# Patient Record
Sex: MALE | Race: WHITE | NOT HISPANIC OR LATINO | Employment: FULL TIME | URBAN - NONMETROPOLITAN AREA
[De-identification: names, ages, dates, MRNs, and addresses within clinical notes are randomized per-mention and may not be internally consistent; named-entity substitution may affect disease eponyms.]

---

## 2017-01-01 ENCOUNTER — HOSPITAL ENCOUNTER (OUTPATIENT)
Dept: PHYSICAL THERAPY | Facility: HOSPITAL | Age: 37
Setting detail: THERAPIES SERIES
Discharge: HOME | End: 2017-01-01
Attending: FAMILY MEDICINE | Admitting: FAMILY MEDICINE

## 2017-01-27 ENCOUNTER — OFFICE VISIT (OUTPATIENT)
Dept: ORTHOPEDIC SURGERY | Facility: CLINIC | Age: 37
End: 2017-01-27

## 2017-01-27 VITALS — WEIGHT: 196 LBS | BODY MASS INDEX: 29.03 KG/M2 | HEIGHT: 69 IN

## 2017-01-27 DIAGNOSIS — M51.26 LUMBAR DISC HERNIATION: Primary | ICD-10-CM

## 2017-01-27 DIAGNOSIS — M25.551 RIGHT HIP PAIN: ICD-10-CM

## 2017-01-27 PROCEDURE — 99203 OFFICE O/P NEW LOW 30 MIN: CPT | Performed by: NURSE PRACTITIONER

## 2017-01-27 NOTE — PROGRESS NOTES
Jose Ferris is a 36 y.o. male   Primary provider:  Ancelmo Curran MD       Chief Complaint   Patient presents with   • Right Hip - Pain, Establish Care       HISTORY OF PRESENT ILLNESS: patient states that pain increases when laying down.     HPI Comments: Patient started after helping a neighbor move.     Pain   This is a new problem. Episode onset: 10/2016. The problem occurs constantly. The problem has been unchanged. Associated symptoms include abdominal pain, fatigue, joint swelling, numbness and weakness. Associated symptoms comments: Sharp, dull ache, burning, grinding, popping. . The symptoms are aggravated by standing and walking (laying on back or side). He has tried nothing for the symptoms.        CONCURRENT MEDICAL HISTORY:    Past Medical History   Diagnosis Date   • Backache    • Carbuncle of buttock      paulina rectal   • Contact dermatitis    • Exanthematous disorder    • Obsessive compulsive disorder    • Perirectal abscess      s/p i and d      • Tobacco dependence syndrome    • Upper respiratory infection        No Known Allergies      Current Outpatient Prescriptions:   •  methadone (DOLOPHINE) 10 MG/5ML solution, Take  by mouth. 5.5 ml once a day, Disp: , Rfl:   •  sulfamethoxazole-trimethoprim (BACTRIM DS,SEPTRA DS) 800-160 MG per tablet, Take 1 tablet by mouth Daily., Disp: , Rfl:   •  amitriptyline (ELAVIL) 25 MG tablet, 1-2 qhs for back spasm till seen again, Disp: 30 tablet, Rfl: 1  •  fluocinonide (LIDEX) 0.05 % ointment, , Disp: , Rfl:     Past Surgical History   Procedure Laterality Date   • Incision and drainage abscess  12/30/2014     Incision and drainage of perirectal abscess.   • Tonsillectomy and adenoidectomy         Family History   Problem Relation Age of Onset   • Cancer Other    • Breast cancer Other    • Hypertension Mother    • Hypertension Father    • Stroke Father        Social History     Social History   • Marital status:      Spouse name: N/A   • Number of  "children: N/A   • Years of education: N/A     Occupational History   • Not on file.     Social History Main Topics   • Smoking status: Current Every Day Smoker     Packs/day: 1.50   • Smokeless tobacco: Not on file   • Alcohol use No   • Drug use: Not on file      Comment: SUBSTANCE ABUSE PAST   • Sexual activity: Not on file     Other Topics Concern   • Not on file     Social History Narrative        Review of Systems   Constitutional: Positive for fatigue.   Gastrointestinal: Positive for abdominal pain.   Musculoskeletal: Positive for back pain and joint swelling.   Neurological: Positive for weakness and numbness.   Psychiatric/Behavioral: Positive for sleep disturbance.       PHYSICAL EXAMINATION:       Visit Vitals   • Ht 69\" (175.3 cm)   • Wt 196 lb (88.9 kg)   • BMI 28.94 kg/m2       Physical Exam   Constitutional: He is oriented to person, place, and time. Vital signs are normal. He appears well-developed and well-nourished. He is cooperative.   HENT:   Head: Normocephalic and atraumatic.   Neck: Trachea normal and phonation normal.   Cardiovascular: Regular rhythm, S1 normal, S2 normal, normal heart sounds and normal pulses.    Pulmonary/Chest: Effort normal and breath sounds normal. No respiratory distress.   Abdominal: Soft. Normal appearance and bowel sounds are normal. He exhibits no distension.   Neurological: He is alert and oriented to person, place, and time. Gait abnormal. GCS eye subscore is 4. GCS verbal subscore is 5. GCS motor subscore is 6.   Skin: Skin is warm, dry and intact.   Psychiatric: He has a normal mood and affect. His speech is normal and behavior is normal. Judgment and thought content normal. Cognition and memory are normal.   Vitals reviewed.      GAIT:     []  Normal  [x]  Antalgic    Assistive device: [x]  None  []  Walker     []  Crutches  []  Cane      []  Wheelchair  []  Stretcher    Back Exam     Tenderness   The patient is experiencing tenderness in the sacroiliac and " lumbar.    Range of Motion   Extension: abnormal   Flexion: abnormal   Lateral Bend Right: abnormal   Lateral Bend Left: abnormal   Rotation Right: abnormal   Rotation Left: abnormal     Muscle Strength   Right Quadriceps:  5/5   Left Quadriceps:  5/5   Right Hamstrings:  4/5   Left Hamstrings:  4/5     Tests   Straight leg raise right: positive  Straight leg raise left: positive    Reflexes   Patellar: 2/4  Achilles: 2/4    Other   Toe Walk: abnormal  Heel Walk: abnormal  Sensation: normal  Gait: abnormal   Erythema: no back redness  Scars: absent              MRI lumbar spine without contrast1/25/2017  [facility]  Result Impression         1. AT L4-5, RIGHT PARACENTRAL DISC HERNIATION IMPINGES UPON THE RIGHT L5 NERVE ROOT IN THE LATERAL RECESS.      2. NO OTHER SIGNIFICANT FINDINGS.   Result Narrative   MR LUMBAR SPINE WITHOUT CONTRAST    Comparisons:Plain films 12/21/16    Indications:Low back pain, lumbar strain    Technique: Axial and sagittal MR images of the lumbar spine without contrast.    Discussion: The patient was unable to lie in the standard position in the magnet, due to severe pain, and cisterns are midline on his side. Also, the patient required being brought out of the magnet after each sequence due to pain. Images are motion   degraded.    L1-2:Normal    L2-3:Normal    L3-4:Minimal disc bulging without central or foraminal encroachment    L4-5:Moderate sized focal right paracentral disc protrusion/herniation impinges upon the right L5 nerve root in the lateral recess and could be a source of a right L5 radiculopathy. There is minor caudal extrusion of disc material. No foraminal stenosis.    L5-S1:Normal    Bony signal is normal. The visualized distal spinal cord and paraspinal soft tissues are normal         X-ray lumbar spine AP and [id number]/21/2016  [facility]  Result Impression     Unremarkable imaging of the lumbar spine.   Result Narrative   Exam: AP and lateral pelvis      Clinical Indication: Pain    Technique: AP, Lateral     Comparison: None    Findings: No acute fractures or bony lesions appreciated. There is slight loss of lumbar lordosis. No spondylolysis or spondylolisthesis is seen within the lumbar spine. No considerable joint space narrowing within the lumbar spine. The sacroiliac joints   are unremarkable. There is a very gentle apex right lumbar curvature seen on the coronal plane.         No results found.        ASSESSMENT:    Diagnoses and all orders for this visit:    Lumbar disc herniation  Comments:  L 4/5 right   Orders:  -     Cancel: Case Request  -     Case Request    Right hip pain  -     Cancel: Case Request  -     Case Request          PLAN  Dr. Akins reviewed the MRI and evaluated the patient with me today recommending laminectomy discectomy at L4 5 on the right.  Patients fail consist of conservative management with physical therapy, consistent doses of steroids anti-inflammatories and pain medications over the past several weeks.  He's been unable to work his regular duty job for several weeks now due to the pain that is exacerbated with standing and becomes unbearable after about an hour and a half of work.  He'll need to remain off work until after he has healed from the surgery.  Risks benefits and treatment options discussed in detail with the patient by Dr. Akins and he verbalized understanding of discharge instructions and plan of care.  No Follow-up on file.    MARY ALICE Serrano

## 2017-01-30 ENCOUNTER — PREP FOR SURGERY (OUTPATIENT)
Dept: ORTHOPEDIC SURGERY | Facility: CLINIC | Age: 37
End: 2017-01-30

## 2017-01-30 DIAGNOSIS — M51.26 DISPLACEMENT OF LUMBAR INTERVERTEBRAL DISC WITHOUT MYELOPATHY: Primary | ICD-10-CM

## 2017-02-21 ENCOUNTER — APPOINTMENT (OUTPATIENT)
Dept: PREADMISSION TESTING | Facility: HOSPITAL | Age: 37
End: 2017-02-21

## 2017-02-21 VITALS
SYSTOLIC BLOOD PRESSURE: 138 MMHG | BODY MASS INDEX: 29.18 KG/M2 | OXYGEN SATURATION: 97 % | DIASTOLIC BLOOD PRESSURE: 80 MMHG | RESPIRATION RATE: 18 BRPM | HEIGHT: 69 IN | WEIGHT: 197 LBS | HEART RATE: 88 BPM

## 2017-02-21 DIAGNOSIS — M51.26 DISPLACEMENT OF LUMBAR INTERVERTEBRAL DISC WITHOUT MYELOPATHY: ICD-10-CM

## 2017-02-21 LAB
BASOPHILS # BLD AUTO: 0.03 10*3/MM3 (ref 0–0.2)
BASOPHILS NFR BLD AUTO: 0.3 % (ref 0–2)
DEPRECATED RDW RBC AUTO: 40.6 FL (ref 35.1–43.9)
EOSINOPHIL # BLD AUTO: 0.42 10*3/MM3 (ref 0–0.7)
EOSINOPHIL NFR BLD AUTO: 4 % (ref 0–7)
ERYTHROCYTE [DISTWIDTH] IN BLOOD BY AUTOMATED COUNT: 12.7 % (ref 11.5–14.5)
HCT VFR BLD AUTO: 43.4 % (ref 39–49)
HGB BLD-MCNC: 15.6 G/DL (ref 13.7–17.3)
IMM GRANULOCYTES # BLD: 0.03 10*3/MM3 (ref 0–0.02)
IMM GRANULOCYTES NFR BLD: 0.3 % (ref 0–0.5)
LYMPHOCYTES # BLD AUTO: 4.29 10*3/MM3 (ref 0.6–4.2)
LYMPHOCYTES NFR BLD AUTO: 40.7 % (ref 10–50)
MCH RBC QN AUTO: 31.4 PG (ref 26.5–34)
MCHC RBC AUTO-ENTMCNC: 35.9 G/DL (ref 31.5–36.3)
MCV RBC AUTO: 87.3 FL (ref 80–98)
MONOCYTES # BLD AUTO: 0.77 10*3/MM3 (ref 0–0.9)
MONOCYTES NFR BLD AUTO: 7.3 % (ref 0–12)
NEUTROPHILS # BLD AUTO: 5.01 10*3/MM3 (ref 2–8.6)
NEUTROPHILS NFR BLD AUTO: 47.4 % (ref 37–80)
NRBC BLD MANUAL-RTO: 0 /100 WBC (ref 0–0)
PLATELET # BLD AUTO: 253 10*3/MM3 (ref 150–450)
PMV BLD AUTO: 10.2 FL (ref 8–12)
RBC # BLD AUTO: 4.97 10*6/MM3 (ref 4.37–5.74)
WBC NRBC COR # BLD: 10.55 10*3/MM3 (ref 3.2–9.8)

## 2017-02-21 PROCEDURE — 36415 COLL VENOUS BLD VENIPUNCTURE: CPT

## 2017-02-21 PROCEDURE — 85025 COMPLETE CBC W/AUTO DIFF WBC: CPT | Performed by: ORTHOPAEDIC SURGERY

## 2017-02-21 RX ORDER — SODIUM CHLORIDE, SODIUM GLUCONATE, SODIUM ACETATE, POTASSIUM CHLORIDE, AND MAGNESIUM CHLORIDE 526; 502; 368; 37; 30 MG/100ML; MG/100ML; MG/100ML; MG/100ML; MG/100ML
1000 INJECTION, SOLUTION INTRAVENOUS CONTINUOUS PRN
Status: CANCELLED | OUTPATIENT
Start: 2017-02-28

## 2017-02-21 NOTE — DISCHARGE INSTRUCTIONS
[facility]    Preparing the Skin Before Surgery  Preparing or “prepping” skin before surgery can reduce the risk of infection at the surgical site. To make the process easier, this facility has chosen disposable cloths moistened with a rinse-free, 2% Chlorhexidine Gluconate (CHG) antiseptic solution. The steps below outline the prepping process and should be carefully followed.      Prep the skin at the following time(s):                       Prep the circled area(s) only:        *Skin should be prepped at home on   the night prior to surgery.         *If you wish to bathe/shower, do so   at least one hour before you prep   your skin with the cloths.  *Do not shave hair in surgical area for at   least 2 days prior to applying prep  _______________________________  _______________________________       Directions for Prepping Skin:  ? When applying CHG, your skin should be completely dry and cool.  ? Open package and remove cloths. Avoid touching cloths to any surface other than specified area to reduce the risk of contamination.  ? Prep the area(s) circled above by wiping the area in a back-and-forth motion.    Avoid contact with eyes, ears, mouth, and mucous membranes. When applied to sensitive skin, CHG may cause skin irritation such as a temporary itching sensation  and/or redness.         If itching or redness persists, rinse affected areas and discontinue use.  ? Use all cloths in the package(s).   ? Allow area to air dry for one minute. DO NOT RINSE. It is normal for the skin to have a temporary “tacky” feel for several minutes after the antiseptic solution is applied.   ? Discard cloths in trash can.  ? Place the Prep Check™ sticker(s) from the package on the bottom of this sheet as indicated.  ? Once the skin prep has been completed, do not bathe/shower, apply make-up, powder, or lotions to skin.

## 2017-02-27 ENCOUNTER — ANESTHESIA EVENT (OUTPATIENT)
Dept: PERIOP | Facility: HOSPITAL | Age: 37
End: 2017-02-27

## 2017-02-28 ENCOUNTER — HOSPITAL ENCOUNTER (OUTPATIENT)
Facility: HOSPITAL | Age: 37
Setting detail: HOSPITAL OUTPATIENT SURGERY
Discharge: HOME | End: 2017-02-28
Attending: ORTHOPAEDIC SURGERY | Admitting: ORTHOPAEDIC SURGERY

## 2017-02-28 ENCOUNTER — APPOINTMENT (OUTPATIENT)
Dept: GENERAL RADIOLOGY | Facility: HOSPITAL | Age: 37
End: 2017-02-28

## 2017-02-28 ENCOUNTER — ANESTHESIA (OUTPATIENT)
Dept: PERIOP | Facility: HOSPITAL | Age: 37
End: 2017-02-28

## 2017-02-28 VITALS
SYSTOLIC BLOOD PRESSURE: 128 MMHG | HEART RATE: 104 BPM | OXYGEN SATURATION: 96 % | DIASTOLIC BLOOD PRESSURE: 83 MMHG | RESPIRATION RATE: 20 BRPM | WEIGHT: 194.45 LBS | BODY MASS INDEX: 28.8 KG/M2 | HEIGHT: 69 IN | TEMPERATURE: 99.2 F

## 2017-02-28 DIAGNOSIS — M51.26 DISPLACEMENT OF LUMBAR INTERVERTEBRAL DISC WITHOUT MYELOPATHY: ICD-10-CM

## 2017-02-28 DIAGNOSIS — M51.26 DISPLACEMENT OF LUMBAR INTERVERTEBRAL DISC WITHOUT MYELOPATHY: Primary | ICD-10-CM

## 2017-02-28 LAB
AMPHET+METHAMPHET UR QL: NEGATIVE
ANION GAP SERPL CALCULATED.3IONS-SCNC: 10 MMOL/L (ref 5–15)
APTT PPP: 28.6 SECONDS (ref 20–40.3)
BARBITURATES UR QL SCN: NEGATIVE
BENZODIAZ UR QL SCN: NEGATIVE
BUN BLD-MCNC: 12 MG/DL (ref 7–21)
BUN/CREAT SERPL: 15.4 (ref 7–25)
CALCIUM SPEC-SCNC: 9.5 MG/DL (ref 8.4–10.2)
CANNABINOIDS SERPL QL: NEGATIVE
CHLORIDE SERPL-SCNC: 100 MMOL/L (ref 95–110)
CO2 SERPL-SCNC: 28 MMOL/L (ref 22–31)
COCAINE UR QL: NEGATIVE
CREAT BLD-MCNC: 0.78 MG/DL (ref 0.7–1.3)
DEPRECATED RDW RBC AUTO: 39.1 FL (ref 35.1–43.9)
ERYTHROCYTE [DISTWIDTH] IN BLOOD BY AUTOMATED COUNT: 12.5 % (ref 11.5–14.5)
GFR SERPL CREATININE-BSD FRML MDRD: 112 ML/MIN/1.73 (ref 70–162)
GLUCOSE BLD-MCNC: 107 MG/DL (ref 60–100)
HCT VFR BLD AUTO: 42 % (ref 39–49)
HGB BLD-MCNC: 15.4 G/DL (ref 13.7–17.3)
INR PPP: 1.01 (ref 0.8–1.2)
MCH RBC QN AUTO: 31.4 PG (ref 26.5–34)
MCHC RBC AUTO-ENTMCNC: 36.7 G/DL (ref 31.5–36.3)
MCV RBC AUTO: 85.7 FL (ref 80–98)
METHADONE UR QL SCN: POSITIVE
OPIATES UR QL: NEGATIVE
OXYCODONE UR QL SCN: NEGATIVE
PLATELET # BLD AUTO: 253 10*3/MM3 (ref 150–450)
PMV BLD AUTO: 9.8 FL (ref 8–12)
POTASSIUM BLD-SCNC: 3.9 MMOL/L (ref 3.5–5.1)
PROTHROMBIN TIME: 13.3 SECONDS (ref 11.1–15.3)
RBC # BLD AUTO: 4.9 10*6/MM3 (ref 4.37–5.74)
SODIUM BLD-SCNC: 138 MMOL/L (ref 137–145)
WBC NRBC COR # BLD: 10.77 10*3/MM3 (ref 3.2–9.8)

## 2017-02-28 PROCEDURE — 80307 DRUG TEST PRSMV CHEM ANLYZR: CPT | Performed by: ANESTHESIOLOGY

## 2017-02-28 PROCEDURE — 25010000002 METHOCARBAMOL 1000 MG/10ML SOLUTION 10 ML VIAL: Performed by: ORTHOPAEDIC SURGERY

## 2017-02-28 PROCEDURE — 76000 FLUOROSCOPY <1 HR PHYS/QHP: CPT

## 2017-02-28 PROCEDURE — 25010000002 LIDOCAINE PER 10 MG: Performed by: NURSE ANESTHETIST, CERTIFIED REGISTERED

## 2017-02-28 PROCEDURE — 25010000002 FENTANYL CITRATE (PF) 100 MCG/2ML SOLUTION: Performed by: NURSE ANESTHETIST, CERTIFIED REGISTERED

## 2017-02-28 PROCEDURE — 25010000002 VANCOMYCIN PER 500 MG: Performed by: NURSE ANESTHETIST, CERTIFIED REGISTERED

## 2017-02-28 PROCEDURE — 85610 PROTHROMBIN TIME: CPT | Performed by: ORTHOPAEDIC SURGERY

## 2017-02-28 PROCEDURE — 25010000002 HYDROMORPHONE PER 4 MG: Performed by: NURSE ANESTHETIST, CERTIFIED REGISTERED

## 2017-02-28 PROCEDURE — 63030 LAMOT DCMPRN NRV RT 1 LMBR: CPT | Performed by: ORTHOPAEDIC SURGERY

## 2017-02-28 PROCEDURE — 80048 BASIC METABOLIC PNL TOTAL CA: CPT | Performed by: ORTHOPAEDIC SURGERY

## 2017-02-28 PROCEDURE — 25010000002 PROPOFOL 10 MG/ML EMULSION: Performed by: NURSE ANESTHETIST, CERTIFIED REGISTERED

## 2017-02-28 PROCEDURE — 25010000002 ONDANSETRON PER 1 MG: Performed by: NURSE ANESTHETIST, CERTIFIED REGISTERED

## 2017-02-28 PROCEDURE — 25010000002 HYDROMORPHONE PER 4 MG: Performed by: ANESTHESIOLOGY

## 2017-02-28 PROCEDURE — 85027 COMPLETE CBC AUTOMATED: CPT | Performed by: ORTHOPAEDIC SURGERY

## 2017-02-28 PROCEDURE — 85730 THROMBOPLASTIN TIME PARTIAL: CPT | Performed by: ORTHOPAEDIC SURGERY

## 2017-02-28 PROCEDURE — 25010000002 MEPERIDINE 25 MG/0.5ML SOLUTION: Performed by: NURSE ANESTHETIST, CERTIFIED REGISTERED

## 2017-02-28 RX ORDER — FENTANYL CITRATE 50 UG/ML
50 INJECTION, SOLUTION INTRAMUSCULAR; INTRAVENOUS
Status: DISCONTINUED | OUTPATIENT
Start: 2017-02-28 | End: 2017-02-28 | Stop reason: HOSPADM

## 2017-02-28 RX ORDER — PROMETHAZINE HYDROCHLORIDE 12.5 MG/1
12.5 TABLET ORAL ONCE AS NEEDED
Status: DISCONTINUED | OUTPATIENT
Start: 2017-02-28 | End: 2017-02-28 | Stop reason: HOSPADM

## 2017-02-28 RX ORDER — PROMETHAZINE HYDROCHLORIDE 25 MG/1
25 TABLET ORAL ONCE AS NEEDED
Status: DISCONTINUED | OUTPATIENT
Start: 2017-02-28 | End: 2017-02-28 | Stop reason: HOSPADM

## 2017-02-28 RX ORDER — MAGNESIUM HYDROXIDE 1200 MG/15ML
LIQUID ORAL AS NEEDED
Status: DISCONTINUED | OUTPATIENT
Start: 2017-02-28 | End: 2017-02-28 | Stop reason: HOSPADM

## 2017-02-28 RX ORDER — LABETALOL HYDROCHLORIDE 5 MG/ML
5 INJECTION, SOLUTION INTRAVENOUS
Status: DISCONTINUED | OUTPATIENT
Start: 2017-02-28 | End: 2017-02-28 | Stop reason: HOSPADM

## 2017-02-28 RX ORDER — DIPHENHYDRAMINE HYDROCHLORIDE 50 MG/ML
6.25 INJECTION INTRAMUSCULAR; INTRAVENOUS
Status: DISCONTINUED | OUTPATIENT
Start: 2017-02-28 | End: 2017-02-28 | Stop reason: HOSPADM

## 2017-02-28 RX ORDER — FLUMAZENIL 0.1 MG/ML
0.2 INJECTION INTRAVENOUS AS NEEDED
Status: DISCONTINUED | OUTPATIENT
Start: 2017-02-28 | End: 2017-02-28 | Stop reason: HOSPADM

## 2017-02-28 RX ORDER — DIAZEPAM 5 MG/1
10 TABLET ORAL ONCE AS NEEDED
Status: DISCONTINUED | OUTPATIENT
Start: 2017-02-28 | End: 2017-02-28 | Stop reason: HOSPADM

## 2017-02-28 RX ORDER — HYDRALAZINE HYDROCHLORIDE 20 MG/ML
5 INJECTION INTRAMUSCULAR; INTRAVENOUS
Status: DISCONTINUED | OUTPATIENT
Start: 2017-02-28 | End: 2017-02-28 | Stop reason: HOSPADM

## 2017-02-28 RX ORDER — LIDOCAINE HYDROCHLORIDE ANHYDROUS AND DEXTROSE MONOHYDRATE 5; 400 G/100ML; MG/100ML
1-4 INJECTION, SOLUTION INTRAVENOUS ONCE
Status: COMPLETED | OUTPATIENT
Start: 2017-02-28 | End: 2017-02-28

## 2017-02-28 RX ORDER — PROMETHAZINE HYDROCHLORIDE 25 MG/ML
6.25 INJECTION, SOLUTION INTRAMUSCULAR; INTRAVENOUS ONCE AS NEEDED
Status: DISCONTINUED | OUTPATIENT
Start: 2017-02-28 | End: 2017-02-28 | Stop reason: HOSPADM

## 2017-02-28 RX ORDER — PROPOFOL 10 MG/ML
VIAL (ML) INTRAVENOUS AS NEEDED
Status: DISCONTINUED | OUTPATIENT
Start: 2017-02-28 | End: 2017-02-28 | Stop reason: SURG

## 2017-02-28 RX ORDER — BACITRACIN 50000 [USP'U]/1
INJECTION, POWDER, LYOPHILIZED, FOR SOLUTION INTRAMUSCULAR AS NEEDED
Status: DISCONTINUED | OUTPATIENT
Start: 2017-02-28 | End: 2017-02-28 | Stop reason: HOSPADM

## 2017-02-28 RX ORDER — FENTANYL CITRATE 50 UG/ML
INJECTION, SOLUTION INTRAMUSCULAR; INTRAVENOUS AS NEEDED
Status: DISCONTINUED | OUTPATIENT
Start: 2017-02-28 | End: 2017-02-28 | Stop reason: SURG

## 2017-02-28 RX ORDER — OXYCODONE AND ACETAMINOPHEN 7.5; 325 MG/1; MG/1
1 TABLET ORAL EVERY 4 HOURS PRN
Qty: 60 TABLET | Refills: 0 | Status: SHIPPED | OUTPATIENT
Start: 2017-02-28

## 2017-02-28 RX ORDER — ONDANSETRON 2 MG/ML
INJECTION INTRAMUSCULAR; INTRAVENOUS AS NEEDED
Status: DISCONTINUED | OUTPATIENT
Start: 2017-02-28 | End: 2017-02-28 | Stop reason: SURG

## 2017-02-28 RX ORDER — SODIUM CHLORIDE, SODIUM LACTATE, POTASSIUM CHLORIDE, CALCIUM CHLORIDE 600; 310; 30; 20 MG/100ML; MG/100ML; MG/100ML; MG/100ML
9 INJECTION, SOLUTION INTRAVENOUS CONTINUOUS
Status: DISCONTINUED | OUTPATIENT
Start: 2017-02-28 | End: 2017-02-28 | Stop reason: HOSPADM

## 2017-02-28 RX ORDER — SODIUM CHLORIDE, SODIUM GLUCONATE, SODIUM ACETATE, POTASSIUM CHLORIDE, AND MAGNESIUM CHLORIDE 526; 502; 368; 37; 30 MG/100ML; MG/100ML; MG/100ML; MG/100ML; MG/100ML
1000 INJECTION, SOLUTION INTRAVENOUS CONTINUOUS PRN
Status: DISCONTINUED | OUTPATIENT
Start: 2017-02-28 | End: 2017-02-28 | Stop reason: HOSPADM

## 2017-02-28 RX ORDER — OXYCODONE AND ACETAMINOPHEN 7.5; 325 MG/1; MG/1
1 TABLET ORAL EVERY 6 HOURS PRN
Status: DISCONTINUED | OUTPATIENT
Start: 2017-02-28 | End: 2017-02-28 | Stop reason: HOSPADM

## 2017-02-28 RX ORDER — LIDOCAINE HYDROCHLORIDE ANHYDROUS AND DEXTROSE MONOHYDRATE 5; 400 G/100ML; MG/100ML
1-4 INJECTION, SOLUTION INTRAVENOUS CONTINUOUS
Status: CANCELLED | OUTPATIENT
Start: 2017-02-28

## 2017-02-28 RX ORDER — ROCURONIUM BROMIDE 10 MG/ML
INJECTION, SOLUTION INTRAVENOUS AS NEEDED
Status: DISCONTINUED | OUTPATIENT
Start: 2017-02-28 | End: 2017-02-28 | Stop reason: SURG

## 2017-02-28 RX ORDER — OXYCODONE AND ACETAMINOPHEN 7.5; 325 MG/1; MG/1
1 TABLET ORAL ONCE
Status: COMPLETED | OUTPATIENT
Start: 2017-02-28 | End: 2017-02-28

## 2017-02-28 RX ORDER — ONDANSETRON 2 MG/ML
4 INJECTION INTRAMUSCULAR; INTRAVENOUS ONCE AS NEEDED
Status: DISCONTINUED | OUTPATIENT
Start: 2017-02-28 | End: 2017-02-28 | Stop reason: HOSPADM

## 2017-02-28 RX ORDER — LIDOCAINE HYDROCHLORIDE 20 MG/ML
INJECTION, SOLUTION INFILTRATION; PERINEURAL AS NEEDED
Status: DISCONTINUED | OUTPATIENT
Start: 2017-02-28 | End: 2017-02-28 | Stop reason: SURG

## 2017-02-28 RX ORDER — ACETAMINOPHEN 10 MG/ML
1000 INJECTION, SOLUTION INTRAVENOUS ONCE
Status: CANCELLED | OUTPATIENT
Start: 2017-02-28

## 2017-02-28 RX ORDER — METHOCARBAMOL 750 MG/1
750 TABLET, FILM COATED ORAL 4 TIMES DAILY PRN
Qty: 50 TABLET | Refills: 0 | Status: SHIPPED | OUTPATIENT
Start: 2017-02-28

## 2017-02-28 RX ORDER — MORPHINE SULFATE 4 MG/ML
4 INJECTION, SOLUTION INTRAMUSCULAR; INTRAVENOUS EVERY 4 HOURS PRN
Status: DISCONTINUED | OUTPATIENT
Start: 2017-02-28 | End: 2017-02-28 | Stop reason: HOSPADM

## 2017-02-28 RX ORDER — METHOCARBAMOL 750 MG/1
750 TABLET, FILM COATED ORAL ONCE
Status: COMPLETED | OUTPATIENT
Start: 2017-02-28 | End: 2017-02-28

## 2017-02-28 RX ORDER — PROMETHAZINE HYDROCHLORIDE 25 MG/1
25 SUPPOSITORY RECTAL ONCE AS NEEDED
Status: DISCONTINUED | OUTPATIENT
Start: 2017-02-28 | End: 2017-02-28 | Stop reason: HOSPADM

## 2017-02-28 RX ORDER — ACETAMINOPHEN 10 MG/ML
1000 INJECTION, SOLUTION INTRAVENOUS ONCE
Status: COMPLETED | OUTPATIENT
Start: 2017-02-28 | End: 2017-02-28

## 2017-02-28 RX ORDER — NALOXONE HCL 0.4 MG/ML
0.4 VIAL (ML) INJECTION AS NEEDED
Status: DISCONTINUED | OUTPATIENT
Start: 2017-02-28 | End: 2017-02-28 | Stop reason: HOSPADM

## 2017-02-28 RX ORDER — KETAMINE HYDROCHLORIDE 100 MG/ML
INJECTION INTRAMUSCULAR; INTRAVENOUS AS NEEDED
Status: DISCONTINUED | OUTPATIENT
Start: 2017-02-28 | End: 2017-02-28 | Stop reason: SURG

## 2017-02-28 RX ORDER — SODIUM CHLORIDE 0.9 % (FLUSH) 0.9 %
1-10 SYRINGE (ML) INJECTION AS NEEDED
Status: DISCONTINUED | OUTPATIENT
Start: 2017-02-28 | End: 2017-02-28 | Stop reason: HOSPADM

## 2017-02-28 RX ORDER — PROMETHAZINE HYDROCHLORIDE 25 MG/ML
12.5 INJECTION, SOLUTION INTRAMUSCULAR; INTRAVENOUS ONCE AS NEEDED
Status: DISCONTINUED | OUTPATIENT
Start: 2017-02-28 | End: 2017-02-28 | Stop reason: HOSPADM

## 2017-02-28 RX ORDER — BACTERIOSTATIC SODIUM CHLORIDE 0.9 %
VIAL (ML) INJECTION AS NEEDED
Status: DISCONTINUED | OUTPATIENT
Start: 2017-02-28 | End: 2017-02-28 | Stop reason: HOSPADM

## 2017-02-28 RX ADMIN — ROCURONIUM BROMIDE 50 MG: 10 INJECTION INTRAVENOUS at 16:43

## 2017-02-28 RX ADMIN — ACETAMINOPHEN 1000 MG: 10 INJECTION, SOLUTION INTRAVENOUS at 18:08

## 2017-02-28 RX ADMIN — HYDROMORPHONE HYDROCHLORIDE 0.5 MG: 1 INJECTION, SOLUTION INTRAMUSCULAR; INTRAVENOUS; SUBCUTANEOUS at 18:33

## 2017-02-28 RX ADMIN — PROPOFOL 200 MG: 10 INJECTION, EMULSION INTRAVENOUS at 16:43

## 2017-02-28 RX ADMIN — MEPERIDINE HYDROCHLORIDE 12.5 MG: 50 INJECTION, SOLUTION INTRAMUSCULAR; INTRAVENOUS; SUBCUTANEOUS at 18:40

## 2017-02-28 RX ADMIN — HYDROMORPHONE HYDROCHLORIDE 0.5 MG: 1 INJECTION, SOLUTION INTRAMUSCULAR; INTRAVENOUS; SUBCUTANEOUS at 18:43

## 2017-02-28 RX ADMIN — METHOCARBAMOL 750 MG: 750 TABLET ORAL at 20:27

## 2017-02-28 RX ADMIN — KETAMINE HYDROCHLORIDE 10 MG: 100 INJECTION INTRAMUSCULAR; INTRAVENOUS at 17:44

## 2017-02-28 RX ADMIN — VANCOMYCIN HYDROCHLORIDE 1.5 G: 1 INJECTION, POWDER, LYOPHILIZED, FOR SOLUTION INTRAVENOUS at 17:24

## 2017-02-28 RX ADMIN — OXYCODONE HYDROCHLORIDE AND ACETAMINOPHEN 1 TABLET: 7.5; 325 TABLET ORAL at 20:28

## 2017-02-28 RX ADMIN — SODIUM CHLORIDE, SODIUM GLUCONATE, SODIUM ACETATE, POTASSIUM CHLORIDE, AND MAGNESIUM CHLORIDE 1000 ML: 526; 502; 368; 37; 30 INJECTION, SOLUTION INTRAVENOUS at 14:53

## 2017-02-28 RX ADMIN — LIDOCAINE HYDROCHLORIDE 100 MG: 20 INJECTION, SOLUTION INFILTRATION; PERINEURAL at 16:43

## 2017-02-28 RX ADMIN — FENTANYL CITRATE 100 MCG: 50 INJECTION, SOLUTION INTRAMUSCULAR; INTRAVENOUS at 18:30

## 2017-02-28 RX ADMIN — LIDOCAINE HYDROCHLORIDE 2.2 MG/MIN: 4 INJECTION, SOLUTION INTRAVENOUS at 17:20

## 2017-02-28 RX ADMIN — ONDANSETRON 4 MG: 2 INJECTION INTRAMUSCULAR; INTRAVENOUS at 18:07

## 2017-02-28 RX ADMIN — HYDROMORPHONE HYDROCHLORIDE 0.5 MG: 1 INJECTION, SOLUTION INTRAMUSCULAR; INTRAVENOUS; SUBCUTANEOUS at 19:00

## 2017-02-28 RX ADMIN — KETAMINE HYDROCHLORIDE 50 MG: 100 INJECTION INTRAMUSCULAR; INTRAVENOUS at 16:56

## 2017-02-28 NOTE — ANESTHESIA PREPROCEDURE EVALUATION
Anesthesia Evaluation     no history of anesthetic complications:     Airway   Mallampati: I  no difficulty expected  Dental    (+) edentulous    Pulmonary - normal exam   (+) a smoker (current),   (-) COPD, asthma, sleep apnea    PE comment: nonlabored  Cardiovascular - negative cardio ROS and normal exam    Rhythm: regular  Rate: normal    (-) hypertension, valvular problems/murmurs, past MI, CAD, dysrhythmias, angina      Neuro/Psych  (+) psychiatric history (ocd) Anxiety,    (-) seizures, TIA, CVA  GI/Hepatic/Renal/Endo - negative ROS   (-) GERD, liver disease, renal disease, diabetes, hypothyroidism, hyperthyroidism    Musculoskeletal     (+) back pain,   Abdominal    Substance History   (+) drug use (remote h/o opioid abuse-->on methadone)     OB/GYN          Other                                    Anesthesia Plan    ASA 2     general     intravenous induction   Anesthetic plan and risks discussed with patient.

## 2017-02-28 NOTE — ANESTHESIA PROCEDURE NOTES
Airway  Urgency: elective    Airway not difficult    General Information and Staff    Patient location during procedure: OR    Indications and Patient Condition  Indications for airway management: airway protection    Preoxygenated: yes  MILS maintained throughout  Mask difficulty assessment: 2 - vent by mask + OA or adjuvant +/- NMBA    Final Airway Details  Final airway type: endotracheal airway      Successful airway: ETT    Successful intubation technique: direct laryngoscopy  Blade: Ellie  Blade size: #4  ETT size: 7.5 mm  Cormack-Lehane Classification: grade I - full view of glottis  Placement verified by: chest auscultation and capnometry   Measured from: lips  ETT to lips (cm): 22  Number of attempts at approach: 1

## 2017-03-01 NOTE — OP NOTE
CC:          Date of Procedure:  02/28/2017    PREOPERATIVE DIAGNOSIS: Lumbar disk herniation of L4-L5 right side.    POSTOPERATIVE DIAGNOSIS: Lumbar disk herniation of L4-L5 right side.    PROCEDURE PERFORMED: Lumbar diskectomy of L4-L5 right side, radiographic evaluation.     SURGEON:  Porfirio Akins MD    ANESTHESIA: General.    POSITION: Prone.    ESTIMATED BLOOD LOSS: 40.    FINDINGS: Disk protrusion, herniation L4-L5 right-sided.    INDICATIONS: This patient is brought to the operating room, satisfactory airway was established. He was positioned in prone. Care was taken to carefully pad the head and neck, trunk and extremities. Lumbar region is cleansed with alcohol prep and alcohol scrub. Spinal needle was placed to elaine the operative level. A lateral radiograph was obtained to verify the operative level. The spinal needle was removed. The skin was marked. A ChloraPrep-based scrub is applied, utilizing standard surgical barriers and these were positioned. Translucent iodine-impregnated surgical barriers were placed overlying the lumbar regions. Consent form was reviewed and everyone agrees. Intravenous antibiotics were administered prior to skin incision. This consisted of intravenous vancomycin given history of methicillin-resistant Staphylococcus aureus. X-rays and MRI were reviewed prior to skin incision identifying lumbar disk herniation of L4-L5 right side and severe compression of the lateral recess. After administration of antibiotics, incision was made 3 cm just to the right of midline, a longitudinal incision. Dissection was carried through the skin and subcutaneous tissue down to the fascia. The fascia was split in line with the skin incision. Soft tissue dissected from the posterior spinal elements lamina of L4 was identified and the L4-L5 facet. Elevated the soft tissue from the posterior elements and elevated the ligamentum flavum. A curette is positioned underneath the caudal margin of  the L4 lamina. Lateral radiograph was obtained verifying positioning of the curette at the operative level, the L4-L5 level. Hemilaminotomy was performed with a high speed bur followed by a Kerrison rongeur elevating the cephalad margin of the L5 lamina and the caudal margin of the L4 lamina and the medial facetectomy and ligamentum flavum is elevated. I exposed the dura including the traversing nerve root. This was retracted and a large disk protrusion is found at the disk space is L4-L5. This was protruding approximately 1 cm pushing the nerve root into the ligamentum flavum and into the medial aspect of the facet. With the nerve root retracted, I performed a diskectomy utilizing a knife, pituitary rongeur. I removed a large fragment approximately 1.5 cm. This fragment was removed and additional disk fragment is extricated from within the disk space and removed. There was no remaining large extruding fragment, but there was the large disk protrusion subsequently removed. I explored the disk space and the central epidural space. Now, additional disk pieces are identified and at this point the nerve root is free in the lateral recess with no undue compression. Valsalva was performed. No identifiable cerebrospinal fluid leak. Irrigated the disk space. Irrigated the wound with copious saline and after saline lavage, then, I placed Floseal. The nerve root was free within the canal and I then removed the retractor, closed the fascial layer with a running Vicryl suture followed by subcutaneous Vicryl, Nylon and skin sterile dressings were placed. The patient was repositioned, extubated and transported to recovery. Counts correct. No complications.        Porfirio Akins MD  Dictation Date/Time: 02/28/2017 19:51:49(Eastern Time Zone)  Transcribed Date/Time: 02/28/2017 20:58:15 (Eastern Time Zone)  Dictator/ Initials:  AMADOR/gina  Document ID:                [id number]  Job ID: [id number]

## 2017-03-01 NOTE — PLAN OF CARE
Problem: Patient Care Overview (Adult)  Goal: Plan of Care Review  Patient meets discharge criteria

## 2017-03-01 NOTE — DISCHARGE INSTRUCTIONS
May remove dressing in 3 days  Shower after dressing is removed, wash wound with soap and water, place new dressing if drainage is present from wound  No bending, walk as tolerated  Return in 4 weeks for follow up appointment, call #[phone] for appointment.

## 2017-03-01 NOTE — BRIEF OP NOTE
LUMBAR LAMINECTOMY WITH/WITHOUT FUSION  Procedure Note    Jose Ferris  2/28/2017    Pre-op Diagnosis:   Right hip pain [M25.551]  Lumbar disc herniation [M51.26]    Post-op Diagnosis:     Post-Op Diagnosis Codes:     * Right hip pain [M25.551]     * Lumbar disc herniation [M51.26]    Procedure/CPT® Codes:  MN LAMNOTMY INCL W/DCMPRSN NRV ROOT 1 INTRSPC LUMBR [50452]  CHG X-RAY LUMBAR SPINE 2/3 VW [98842]-51    Procedure(s):  LAMINECTOMY LUMBAR DISCECTOMY LUMBAR FOUR-LUMBAR FIVE RIGHT SIDE      Surgeon(s):  Porfirio Akins MD    Anesthesia: General    Staff:   Circulator: Emma Yen RN; Justyna Estrada RN  Scrub Person: Alethea Curran V    Estimated Blood Loss: * 30  Urine Voided: * No values recorded between 2/28/2017  4:40 PM and 2/28/2017  6:20 PM *    Specimens:                *none      Drains:           Findings: lumbar disc herniation    Complications: none      Porfirio Akins MD     Date: 2/28/2017  Time: 6:31 PM

## 2017-03-01 NOTE — PLAN OF CARE
Problem: Patient Care Overview (Adult)  Goal: Plan of Care Review  Outcome: Ongoing (interventions implemented as appropriate)    02/28/17 1929   Coping/Psychosocial Response Interventions   Plan Of Care Reviewed With patient   Patient Care Overview   Progress improving   Outcome Evaluation   Outcome Summary/Follow up Plan Pt pain level is now tolerable and refusing any additional pain meds. Ready for transfer to [facility].         Problem: Perioperative Period (Adult)  Goal: Signs and Symptoms of Listed Potential Problems Will be Absent or Manageable (Perioperative Period)  Outcome: Ongoing (interventions implemented as appropriate)

## 2017-03-29 ENCOUNTER — OFFICE VISIT (OUTPATIENT)
Dept: ORTHOPEDIC SURGERY | Facility: CLINIC | Age: 37
End: 2017-03-29

## 2017-03-29 VITALS — HEIGHT: 69 IN | WEIGHT: 196 LBS | BODY MASS INDEX: 29.03 KG/M2

## 2017-03-29 DIAGNOSIS — M51.26 DISPLACEMENT OF LUMBAR INTERVERTEBRAL DISC WITHOUT MYELOPATHY: Primary | ICD-10-CM

## 2017-03-29 DIAGNOSIS — M51.26 LUMBAR DISC HERNIATION: ICD-10-CM

## 2017-03-29 PROCEDURE — 72100 X-RAY EXAM L-S SPINE 2/3 VWS: CPT | Performed by: ORTHOPAEDIC SURGERY

## 2017-03-29 PROCEDURE — 99024 POSTOP FOLLOW-UP VISIT: CPT | Performed by: ORTHOPAEDIC SURGERY

## 2017-03-29 NOTE — PROGRESS NOTES
Procedure   Procedures      Lumbar 2v- normal alignment, loss of disc height L4-5 and L5-S1, good bone quality.

## 2017-03-29 NOTE — PROGRESS NOTES
Jose Ferris is a 37 y.o. male is s/p  LAMINECTOMY LUMBAR DISCECTOMY LUMBAR FOUR-LUMBAR FIVE RIGHT SIDE       Chief Complaint   Patient presents with   • Lumbar Spine - Follow-up   suture removal and xrays done today in office m    HISTORY OF PRESENT ILLNESS:     Following lumbar discectomy doing well.  States he feels better, and wishes to return to work in a few weeks.  He is not taking pain medicines at this point.    No Known Allergies      Current Outpatient Prescriptions:   •  albuterol (PROVENTIL) (2.5 MG/3ML) 0.083% nebulizer solution, Take 2.5 mg by nebulization Every 6 (Six) Hours As Needed for Wheezing., Disp: 360 mL, Rfl: 0  •  fluocinonide (LIDEX) 0.05 % ointment, , Disp: , Rfl:   •  methadone (DOLOPHINE) 10 MG/5ML solution, Take  by mouth. 5.5 ml once a day, Disp: , Rfl:   •  methocarbamol (ROBAXIN) 750 MG tablet, Take 1 tablet by mouth 4 (Four) Times a Day As Needed for muscle spasms., Disp: 50 tablet, Rfl: 0  •  mupirocin (BACTROBAN NASAL) 2 % nasal ointment, into each nostril 2 (Two) Times a Day., Disp: 100 g, Rfl: 0  •  oxyCODONE-acetaminophen (PERCOCET) 7.5-325 MG per tablet, Take 1 tablet by mouth Every 4 (Four) Hours As Needed for moderate pain (4-6)., Disp: 60 tablet, Rfl: 0  •  predniSONE (DELTASONE) 20 MG tablet, Take 1 tablet by mouth Daily., Disp: 5 tablet, Rfl: 0  •  sulfamethoxazole-trimethoprim (BACTRIM DS,SEPTRA DS) 800-160 MG per tablet, Take 1 tablet by mouth Daily., Disp: , Rfl:     No fevers or chills.  No nausea or vomiting.      PHYSICAL EXAMINATION:       Jose Ferris is a 37 y.o. male    Patient is awake and alert, answers questions appropriately and is in no apparent distress.    GAIT:     []  Normal  []  Antalgic    Assistive device: []  None  []  Walker     []  Crutches  []  Cane     []  Wheelchair  []  Stretcher    Ortho Exam  Incision healing  5/5 right and left hip flexors, quadriceps, tibialis anterior, extensor hallucis longus and gastroc.      Xr Chest 2  View    Result Date: 3/18/2017  Narrative: Patient Name:  DENILSON CONTRERAS Patient ID:  [id number] Ordering:  AYDE COLON Attending:  AYDE COLON Referring:  AYDE COLON ------------------------------------------------ PROCEDURE: XR CHEST 2 VIEWS INDICATION FOR PROCEDURE:  37 years -old patient presents for evaluation of cough. COMPARISON:  None FINDINGS: XR CHEST 2 views  reveals the lungs are hyperexpanded. There is no radiographic evidence for airspace consolidation, pleural effusion or pneumothorax. Mediastinal and cardiac silhouettes are within normal limits. There is mild prominence of central pulmonary arteries The skeletal structures are within normal limits.     Impression: No radiographic evidence of acute cardiopulmonary disease. Electronically signed by:  Charu Shabazz MD  3/18/2017 4:57 PM CDT Workstation: [employer]          ASSESSMENT:    Diagnoses and all orders for this visit:    Displacement of lumbar intervertebral disc without myelopathy    Lumbar disc herniation        PLAN    Begin physical therapy.  Possibly return to work in 3 weeks.  Progressing well.    Porfirio Akins MD

## 2017-04-05 ENCOUNTER — HOSPITAL ENCOUNTER (OUTPATIENT)
Dept: PHYSICAL THERAPY | Facility: HOSPITAL | Age: 37
Setting detail: THERAPIES SERIES
Discharge: HOME | End: 2017-04-05
Attending: ORTHOPAEDIC SURGERY

## 2017-04-05 DIAGNOSIS — M51.26 DISPLACEMENT OF LUMBAR INTERVERTEBRAL DISC WITHOUT MYELOPATHY: Primary | ICD-10-CM

## 2017-04-05 PROCEDURE — 97110 THERAPEUTIC EXERCISES: CPT | Performed by: PHYSICAL THERAPIST

## 2017-04-05 PROCEDURE — 97162 PT EVAL MOD COMPLEX 30 MIN: CPT | Performed by: PHYSICAL THERAPIST

## 2017-04-05 NOTE — PROGRESS NOTES
Outpatient Physical Therapy Ortho Initial Evaluation  [facility]     Patient Name: Jose Ferris  : 1980  MRN: [id number]  Today's Date: 2017      Visit Date: 2017     Pt attended  scheduled visits.  Re-cert date 17  Pt will RTD 17       Patient Active Problem List   Diagnosis   • Tobacco dependence syndrome   • Backache   • Right hip pain   • Lumbar disc herniation   • Displacement of lumbar intervertebral disc without myelopathy        Past Medical History:   Diagnosis Date   • Backache    • Carbuncle of buttock     paulina rectal   • Contact dermatitis    • Exanthematous disorder    • Obsessive compulsive disorder    • Perirectal abscess     s/p i and d      • Tobacco dependence syndrome    • Upper respiratory infection         Past Surgical History:   Procedure Laterality Date   • INCISION AND DRAINAGE ABSCESS  2014    Incision and drainage of perirectal abscess.   • LUMBAR LAMINECTOMY Right 2017    Procedure: LAMINECTOMY LUMBAR DISCECTOMY LUMBAR FOUR-LUMBAR FIVE RIGHT SIDE  ;  Surgeon: Porfirio Akins MD;  Location: [facility];  Service:    • TONSILLECTOMY     • TONSILLECTOMY AND ADENOIDECTOMY       Medications (Admitted on 2017)     Outpatient Medications     albuterol (PROVENTIL) (2.5 MG/3ML) 0.083% nebulizer solution      fluocinonide (LIDEX) 0.05 % ointment      methadone (DOLOPHINE) 10 MG/5ML solution      methocarbamol (ROBAXIN) 750 MG tablet      mupirocin (BACTROBAN NASAL) 2 % nasal ointment      oxyCODONE-acetaminophen (PERCOCET) 7.5-325 MG per tablet      predniSONE (DELTASONE) 20 MG tablet      sulfamethoxazole-trimethoprim (BACTRIM DS,SEPTRA DS) 800-160 MG per tablet          Visit Dx:     ICD-10-CM ICD-9-CM   1. Displacement of lumbar intervertebral disc without myelopathy M51.26 722.10                 PT Ortho       17 1700    Subjective Comments    Subjective Comments 36 y/o R handed male presents  S/P lumbar discectomy   "2/28/17.  States has not been doin gmuch HEp other than walking. Pt denies pain in the lower back or LEs, but relates is feelign stiff. No c/o  with sleeping. Pt lives at home with his family in a single story home.Pt is I in HEP, mobility, ADLs. 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 Job involves lifting, carrying, twisting, walking, 8-10 hour shifts. Pt has not returned to work since surgery, but intends to return to job in the next month.  Pt is motivated to regain his functional motion.   -LF    Subjective Pain    Able to rate subjective pain? yes  -LF    Pre-Treatment Pain Level 2  -LF    Posture/Observations    Posture/Observations Comments Pt transfers slowly, gait slow, mild guarding. Healing incision, lumbar region aprox 2 inches in length. Skin intact, dry.   -LF    Special Tests/Palpation    Special Tests/Palpation --   non TTP over lumbar paraspinals  -LF    ROM (Range of Motion)    General ROM Detail Trunk AROM: flexion 30 , ext 10 , SBL 30, R 20 , rotation B 20 , all planes had c/o \"stiffness\".  Seated knee ext, each leg -10  with c/o HS tightness.   -LF    MMT (Manual Muscle Testing)    General MMT Assessment Detail Seated trunk isometric testing 4/5, no c/o pain.   -LF    Gait Assessment/Treatment    Gait, Comment slow gait  -LF      User Key  (r) = Recorded By, (t) = Taken By, (c) = Cosigned By    Initials Name Provider Type    VERONICA Ortega PT Physical Therapist                            Therapy Education       04/05/17 1700          Therapy Education    Given HEP;Symptoms/condition management;Posture/body mechanics  -LF      Program New  -LF      How Provided Verbal;Demonstration;Written  -LF      Provided to Patient;Caregiver  -LF      Level of Understanding Verbalized;Demonstrated  -LF        User Key  (r) = Recorded By, (t) = Taken By, (c) = Cosigned By    Initials Name Provider Type    VERONICA Ortega PT Physical Therapist                PT OP Goals       04/05/17 1700       " PT Short Term Goals    STG Date to Achieve 04/19/17  -LF     STG 1 Pt will demonstrate trunk AROM 50% available range, with min c/o.   -LF     STG 2 Pt will demonstrate 45 degree mini squat / prep for a lift, with good mechanics.   -LF     STG 3 Pt will report Modified Oswestry  score  20% or better  -LF     STG 4 Pt will be I in HEP each session.   -LF     Long Term Goals    LTG Date to Achieve 05/03/17  -LF     LTG 1 Pt will demonstrate functional trunk ROM with min c/o.   -LF     LTG 2 Pt will perform lift of 20# from floor to waist with good mechanics.   -LF     LTG 3 Pt will perform 20 min continuous aerobic exercise with min c/o fatigue.   -LF     LTG 4 Pt will be I in final HEP to continue after formal therapy is discontinued.   -     Time Calculation    PT Goal Re-Cert Due Date 04/26/17  -       User Key  (r) = Recorded By, (t) = Taken By, (c) = Cosigned By    Initials Name Provider Type     Deirdre Ortega, PT Physical Therapist                PT Assessment/Plan       04/05/17 1700       PT Assessment    Functional Limitations Limitation in home management;Limitations in community activities;Limitations in functional capacity and performance;Performance in leisure activities;Performance in work activities  -     Impairments Endurance;Gait;Impaired muscle length;Impaired muscle power;Muscle strength;Pain;Posture;Range of motion;Poor body mechanics  -     Assessment Comments 36 y/o male S/P discectomy, 2/28/17, demonstrates limited trunk ROM, limited flexibility, poor strnk strength/ stabilization, lack of HEP, limited endurance. Pt may benefit from skilled intervention to address the above limitations.   -     Please refer to paper survey for additional self-reported information Yes  -LF     Rehab Potential Good  -LF     Patient/caregiver participated in establishment of treatment plan and goals Yes  -LF     Patient would benefit from skilled therapy intervention Yes  -LF     PT Plan    PT  "Frequency 2x/week  -LF     Predicted Duration of Therapy Intervention (days/wks) 4 weeks  -LF     Planned CPT's? PT RE-EVAL: 40532;PT THER PROC EA 15 MIN: 14211;PT THER ACT EA 15 MIN: 78805;PT MANUAL THERAPY EA 15 MIN: 92670;PT NEUROMUSC RE-EDUCATION EA 15 MIN: 23530;PT AQUATIC THERAPY EA 15 MIN: 38172;PT HOT OR COLD PACK TREAT MCARE;PT ELECTRICAL STIM ATTD EA 15 MIN: 56575  -LF     Physical Therapy Interventions (Optional Details) aquatics exercise;home exercise program;lumbar stabilization;manual therapy techniques;modalities;neuromuscular re-education;patient/family education;postural re-education;ROM (Range of Motion);strengthening;stretching;swiss ball techniques  -LF     PT Plan Comments Continue therapy involving education, stretching, strengthening/ stabilization training, modalities PRN, possibly aquatics, progressive HEP.   -LF       User Key  (r) = Recorded By, (t) = Taken By, (c) = Cosigned By    Initials Name Provider Type     Deirdre Ortega, PT Physical Therapist                  Exercises       04/05/17 1700 04/05/17 1600       Subjective Comments    Subjective Comments 38 y/o R handed male presents  S/P lumbar discectomy  2/28/17.  States has not been doin gmuch HEp other than walking. Pt denies pain in the lower back or LEs, but relates is feelign stiff. No c/o  with sleeping. Pt lives at home with his family in a single story home.Pt is I in HEP, mobility, ADLs. 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 Pt has not returned to work since surgery, but intends to return to job in the next month.  Pt is motivated to regain his functional motion.   -LF      Subjective Pain    Able to rate subjective pain? yes  -LF      Pre-Treatment Pain Level 2  -LF      Exercise 1    Exercise Name 1  seated ankle/ knee pumps   -LF     Cueing 1  Verbal;Demo  -LF     Sets 1  2  -LF     Reps 1  10  -LF     Exercise 2    Exercise Name 2  seated " "trunk flex/ ext (\"sit tall <> slouch) with hands on lap  -LF     Cueing 2  Verbal;Demo  -LF     Sets 2  2  -LF     Reps 2  10  -LF     Exercise 3    Exercise Name 3  SKC  -LF     Cueing 3  Verbal;Demo  -LF     Reps 3  10  -LF     Exercise 4    Exercise Name 4  DKC  -LF     Cueing 4  Verbal;Demo  -LF     Reps 4  10  -LF     Exercise 5    Exercise Name 5  LTR   -LF     Cueing 5  Verbal  -LF     Reps 5  10  -LF     Exercise 6    Exercise Name 6  mini squat with chair  -LF     Cueing 6  Demo  -LF     Sets 6  2  -LF     Reps 6  10  -LF       User Key  (r) = Recorded By, (t) = Taken By, (c) = Cosigned By    Initials Name Provider Type    VERONICA Ortega PT Physical Therapist                              Outcome Measures       04/05/17 1700          Modified Oswestry    Modified Oswestry Score/Comments 15=30%  -LF      Functional Assessment    Outcome Measure Options Modifed Owestry  -LF        User Key  (r) = Recorded By, (t) = Taken By, (c) = Cosigned By    Initials Name Provider Type     Deirdre Ortega, PT Physical Therapist            Time Calculation:   Start Time: 1605  Stop Time: 1658  Time Calculation (min): 53 min  Total Timed Code Minutes- PT: 53 minute(s)     Therapy Charges for Today     Code Description Service Date Service Provider Modifiers Qty    52667946167 HC PT EVAL MOD COMPLEXITY 3 4/5/2017 Deirdre Ortega, PT GP 1    31182719141 HC PT THER PROC EA 15 MIN 4/5/2017 Deirdre Ortega, PT GP 1          PT G-Codes  Outcome Measure Options: Modifed Owestry         Deirdre Ortega, PT  4/5/2017      "

## 2017-04-12 ENCOUNTER — HOSPITAL ENCOUNTER (OUTPATIENT)
Dept: PHYSICAL THERAPY | Facility: HOSPITAL | Age: 37
Setting detail: THERAPIES SERIES
Discharge: HOME | End: 2017-04-12
Attending: ORTHOPAEDIC SURGERY

## 2017-04-12 DIAGNOSIS — M51.26 DISPLACEMENT OF LUMBAR INTERVERTEBRAL DISC WITHOUT MYELOPATHY: Primary | ICD-10-CM

## 2017-04-12 PROCEDURE — 97110 THERAPEUTIC EXERCISES: CPT | Performed by: PHYSICAL THERAPIST

## 2017-04-12 NOTE — PROGRESS NOTES
Outpatient Physical Therapy Ortho Treatment Note  [facility]     Patient Name: Jose Ferris  : 1980  MRN: [id number]  Today's Date: 2017      Visit Date: 2017     Pt attended 2/2 scheduled visits.   Pt feels has improved 10% since beginning therapy.  Re-cert date 17  Pt will RTD 17  Pt has 30 approved visits/ year    Visit Dx:    ICD-10-CM ICD-9-CM   1. Displacement of lumbar intervertebral disc without myelopathy M51.26 722.10       Patient Active Problem List   Diagnosis   • Tobacco dependence syndrome   • Backache   • Right hip pain   • Lumbar disc herniation   • Displacement of lumbar intervertebral disc without myelopathy        Past Medical History:   Diagnosis Date   • Backache    • Carbuncle of buttock     paulina rectal   • Contact dermatitis    • Exanthematous disorder    • Obsessive compulsive disorder    • Perirectal abscess     s/p i and d      • Tobacco dependence syndrome    • Upper respiratory infection         Past Surgical History:   Procedure Laterality Date   • INCISION AND DRAINAGE ABSCESS  2014    Incision and drainage of perirectal abscess.   • LUMBAR LAMINECTOMY Right 2017    Procedure: LAMINECTOMY LUMBAR DISCECTOMY LUMBAR FOUR-LUMBAR FIVE RIGHT SIDE  ;  Surgeon: Porfirio Akins MD;  Location: [facility];  Service:    • TONSILLECTOMY     • TONSILLECTOMY AND ADENOIDECTOMY               PT Ortho       17 0900    Subjective Comments    Subjective Comments States feeling good today, just tight, denies pain. No c/o with HEP. States been increasing walking, 1-2x/day, around the block.   -LF    Subjective Pain    Able to rate subjective pain? yes  -LF    Pre-Treatment Pain Level 0  -LF      User Key  (r) = Recorded By, (t) = Taken By, (c) = Cosigned By    Initials Name Provider Type    LF Deirdre Ortega, PT Physical Therapist                            PT Assessment/Plan       17 0900       PT Assessment    Assessment Comments Good  performance of exerciess today, good effort, motivated.   -LF     PT Plan    PT Plan Comments Continue therapy progressing trunk ROM, strength, stabilization, lumbar stabilization, progress HEP.   -LF       User Key  (r) = Recorded By, (t) = Taken By, (c) = Cosigned By    Initials Name Provider Type    LF Deirdre Ortega, PT Physical Therapist                    Exercises       04/12/17 0900          Subjective Comments    Subjective Comments States feeling good today, just tight, denies pain. No c/o with HEP. States been increasing walking, 1-2x/day, around the block.   -LF      Subjective Pain    Able to rate subjective pain? yes  -LF      Pre-Treatment Pain Level 0  -LF      Exercise 1    Exercise Name 1 upright bike, seat 5  -LF      Time (Minutes) 1 10   -LF      Exercise 2    Exercise Name 2 Pro II - arms only, forward 1 min/ back 1 min  -LF      Cueing 2 Demo  -LF      Time (Minutes) 2 6  -LF      Exercise 3    Exercise Name 3 walk 2 laps (250 ft ea lap)  -LF      Time (Minutes) 3 4  -LF      Exercise 4    Exercise Name 4 standing: green tubing, B UE row: feet even, L foot forward, R foot forward  -LF      Cueing 4 Verbal;Demo  -LF      Equipment 4 Theraband  -LF      Resistance 4 Green  -LF      Sets 4 3  -LF      Reps 4 10  -LF      Exercise 5    Exercise Name 5 standing: green tubing, B UE  forward press: feet even, L foot forward, R foot forward  -LF      Cueing 5 Demo  -LF      Equipment 5 Theraband  -LF      Resistance 5 Green  -LF      Sets 5 3  -LF      Reps 5 10  -LF      Exercise 6    Exercise Name 6 SKC  -LF      Cueing 6 Verbal  -LF      Reps 6 10  -LF      Exercise 7    Exercise Name 7 LTR  -LF      Cueing 7 Verbal  -LF      Reps 7 10  -LF      Exercise 8    Exercise Name 8 bridging   -LF      Cueing 8 Demo  -LF      Reps 8 10  -LF      Exercise 9    Exercise Name 9 SKC > HS stretch (knee ext)  -LF      Cueing 9 Demo  -LF      Sets 9 2  -LF      Reps 9 10  -LF        User Key  (r) = Recorded By,  (t) = Taken By, (c) = Cosigned By    Initials Name Provider Type    LF Deirdre Ortega PT Physical Therapist                               PT OP Goals       04/12/17 0900       PT Short Term Goals    STG Date to Achieve 04/19/17  -LF     STG 1 Pt will demonstrate trunk AROM 50% available range, with min c/o.   -LF     STG 1 Progress Progressing  -LF     STG 2 Pt will demonstrate 45 degree mini squat / prep for a lift, with good mechanics.   -LF     STG 2 Progress Progressing  -LF     STG 3 Pt will report Modified Oswestry  score  20% or better  -LF     STG 4 Pt will be I in HEP each session.   -LF     Long Term Goals    LTG Date to Achieve 05/03/17  -LF     LTG 1 Pt will demonstrate functional trunk ROM with min c/o.   -LF     LTG 2 Pt will perform lift of 20# from floor to waist with good mechanics.   -LF     LTG 3 Pt will perform 20 min continuous aerobic exercise with min c/o fatigue.   -LF     LTG 4 Pt will be I in final HEP to continue after formal therapy is discontinued.   -LF     Time Calculation    PT Goal Re-Cert Due Date 04/26/17  -LF       User Key  (r) = Recorded By, (t) = Taken By, (c) = Cosigned By    Initials Name Provider Type    LF Deirdre Ortega PT Physical Therapist                Therapy Education       04/12/17 0900          Therapy Education    Given HEP;Symptoms/condition management  -LF      Program New  -LF      How Provided Verbal;Demonstration;Written  -LF      Provided to Patient  -LF      Level of Understanding Verbalized;Demonstrated  -LF        User Key  (r) = Recorded By, (t) = Taken By, (c) = Cosigned By    Initials Name Provider Type    LF Deirdre Ortega PT Physical Therapist                Time Calculation:   Start Time: 0850  Stop Time: 0935  Time Calculation (min): 45 min  Total Timed Code Minutes- PT: 45 minute(s)    Therapy Charges for Today     Code Description Service Date Service Provider Modifiers Qty    81283339145 HC PT THER PROC EA 15 MIN 4/12/2017 Deirdre Ortega, PT GP 3                     Deirdre Ortega, PT  4/12/2017

## 2017-04-17 ENCOUNTER — OFFICE VISIT (OUTPATIENT)
Dept: ORTHOPEDIC SURGERY | Facility: CLINIC | Age: 37
End: 2017-04-17

## 2017-04-17 VITALS — WEIGHT: 196 LBS | HEIGHT: 69 IN | BODY MASS INDEX: 29.03 KG/M2

## 2017-04-17 DIAGNOSIS — M51.26 LUMBAR DISC HERNIATION: Primary | ICD-10-CM

## 2017-04-17 PROCEDURE — 99024 POSTOP FOLLOW-UP VISIT: CPT | Performed by: ORTHOPAEDIC SURGERY

## 2017-04-17 NOTE — PROGRESS NOTES
Jose Ferris is a 37 y.o. male returns for     Chief Complaint   Patient presents with   • Lumbar Spine - Follow-up       HISTORY OF PRESENT ILLNESS: status post laminectomy 2/28/17  States he feels 19 again, states he is doing well, with minimal symptoms.  He is pleased with post operative progress.  He states he is pleased that he had the operation.         CONCURRENT MEDICAL HISTORY:    Past Medical History:   Diagnosis Date   • Backache    • Carbuncle of buttock     paulina rectal   • Contact dermatitis    • Exanthematous disorder    • Obsessive compulsive disorder    • Perirectal abscess     s/p i and d      • Tobacco dependence syndrome    • Upper respiratory infection        No Known Allergies      Current Outpatient Prescriptions:   •  fluocinonide (LIDEX) 0.05 % ointment, , Disp: , Rfl:   •  methadone (DOLOPHINE) 10 MG/5ML solution, Take  by mouth. 5.5 ml once a day, Disp: , Rfl:   •  methocarbamol (ROBAXIN) 750 MG tablet, Take 1 tablet by mouth 4 (Four) Times a Day As Needed for muscle spasms., Disp: 50 tablet, Rfl: 0  •  predniSONE (DELTASONE) 20 MG tablet, Take 1 tablet by mouth Daily., Disp: 5 tablet, Rfl: 0  •  sulfamethoxazole-trimethoprim (BACTRIM DS,SEPTRA DS) 800-160 MG per tablet, Take 1 tablet by mouth Daily., Disp: , Rfl:   •  albuterol (PROVENTIL) (2.5 MG/3ML) 0.083% nebulizer solution, Take 2.5 mg by nebulization Every 6 (Six) Hours As Needed for Wheezing., Disp: 360 mL, Rfl: 0  •  mupirocin (BACTROBAN NASAL) 2 % nasal ointment, into each nostril 2 (Two) Times a Day., Disp: 100 g, Rfl: 0  •  oxyCODONE-acetaminophen (PERCOCET) 7.5-325 MG per tablet, Take 1 tablet by mouth Every 4 (Four) Hours As Needed for moderate pain (4-6)., Disp: 60 tablet, Rfl: 0    Past Surgical History:   Procedure Laterality Date   • INCISION AND DRAINAGE ABSCESS  12/30/2014    Incision and drainage of perirectal abscess.   • LUMBAR LAMINECTOMY Right 2/28/2017    Procedure: LAMINECTOMY LUMBAR DISCECTOMY LUMBAR  "FOUR-LUMBAR FIVE RIGHT SIDE  ;  Surgeon: Porfirio Akins MD;  Location: [facility];  Service:    • TONSILLECTOMY     • TONSILLECTOMY AND ADENOIDECTOMY         ROS  No fevers or chills.  No chest pain or shortness of air.  No GI or  disturbances.    PHYSICAL EXAMINATION:       Ht 69\" (175.3 cm)  Wt 196 lb (88.9 kg)  BMI 28.94 kg/m2    Physical Exam    GAIT:     []  Normal  []  Antalgic    Assistive device: []  None  []  Walker     []  Crutches  []  Cane     []  Wheelchair  []  Stretcher    Back Exam     Tenderness   The patient is experiencing no tenderness.     Range of Motion   Extension: 60   Flexion: 10   Lateral Bend Right:  10 normal   Lateral Bend Left:  10 normal   Rotation Right:  10 normal   Rotation Left:  10 normal     Tests   Straight leg raise right: negative  Straight leg raise left: negative    Other   Sensation: normal  Gait: normal   Erythema: no back redness  Scars: present              Xr Chest 2 View    Result Date: 3/18/2017  Narrative: Patient Name:  DENILSON CONTRERAS Patient ID:  [id number] Ordering:  AYDE COLON Attending:  AYDE COLON Referring:  AYDE COLON ------------------------------------------------ PROCEDURE: XR CHEST 2 VIEWS INDICATION FOR PROCEDURE:  37 years -old patient presents for evaluation of cough. COMPARISON:  None FINDINGS: XR CHEST 2 views  reveals the lungs are hyperexpanded. There is no radiographic evidence for airspace consolidation, pleural effusion or pneumothorax. Mediastinal and cardiac silhouettes are within normal limits. There is mild prominence of central pulmonary arteries The skeletal structures are within normal limits.     Impression: No radiographic evidence of acute cardiopulmonary disease. Electronically signed by:  Charu Shabazz MD  3/18/2017 4:57 PM CDT Workstation: [email or web address]            ASSESSMENT:    There are no diagnoses linked to this encounter.      PLAN    Doing well, activities as tolerated.      Porfirio Akins MD  "

## 2017-06-26 ENCOUNTER — DOCUMENTATION (OUTPATIENT)
Dept: PHYSICAL THERAPY | Facility: HOSPITAL | Age: 37
End: 2017-06-26

## 2022-02-28 NOTE — MR AVS SNAPSHOT
"                        Jose Ferris   1/27/2017 2:30 PM   Office Visit    Dept Phone:  [phone]   Encounter #:  [id number]    Provider:  MARY ALICE Young   Department:  [facility]                Your Full Care Plan              Your Updated Medication List          This list is accurate as of: 1/27/17  3:45 PM.  Always use your most recent med list.                amitriptyline 25 MG tablet   Commonly known as:  ELAVIL   1-2 qhs for back spasm till seen again       fluocinonide 0.05 % ointment   Commonly known as:  LIDEX       methadone 10 MG/5ML solution   Commonly known as:  DOLOPHINE       sulfamethoxazole-trimethoprim 800-160 MG per tablet   Commonly known as:  BACTRIM DS,SEPTRA DS               You Were Diagnosed With        Codes Comments    Right hip pain    -  Primary ICD-10-CM: M25.551  ICD-9-CM: 719.45       Instructions     None    Patient Instructions History      Upcoming Appointments     Visit Type Date Time Department    NEW PATIENT 1/27/2017  2:30 PM [facility]      MyChart Signup     Our records indicate that you have declined [facility] [employer] signup. If you would like to sign up for [employer], please email [email or web address] or call [phone] to obtain an activation code.             Other Info from Your Visit           Allergies     No Known Allergies      Reason for Visit     Right Hip - Pain, Establish Care           Vital Signs     Height Weight Body Mass Index Smoking Status          69\" (175.3 cm) 196 lb (88.9 kg) 28.94 kg/m2 Current Every Day Smoker        Problems and Diagnoses Noted     Right hip pain        " 28-Feb-2022 12:40

## (undated) DEVICE — COVER,MAYO STAND,STERILE: Brand: MEDLINE

## (undated) DEVICE — GLV SURG SENSICARE GREEN W/ALOE PF LF 6.5 STRL

## (undated) DEVICE — SYR LUERLOK 20CC

## (undated) DEVICE — ADHS LIQ MASTISOL 2/3ML

## (undated) DEVICE — COVER,C-ARM,41X74: Brand: MEDLINE

## (undated) DEVICE — DRSNG GZ CURAD XEROFORM NONADHR OVERWRAP 5X9IN

## (undated) DEVICE — GLV SURG SENSICARE ALOE LF PF SZ7.5 GRN

## (undated) DEVICE — SUT VIC 2/0 SH 27IN

## (undated) DEVICE — CAUTERY TIP POLISHER: Brand: DEVON

## (undated) DEVICE — SUT ETHLN 2/0 FS 18IN 664H

## (undated) DEVICE — PK SPINE LF 60

## (undated) DEVICE — SUT VICRYL 1 CP-2 27IN J871H

## (undated) DEVICE — 1010 S-DRAPE TOWEL DRAPE 10/BX: Brand: STERI-DRAPE™

## (undated) DEVICE — GLV SURG SENSICARE MICRO PF LF 7.5 STRL

## (undated) DEVICE — GLV SURG TRIUMPH ORTHO W/ALOE PF LTX 7.0

## (undated) DEVICE — DRSNG SPNG GZ WOVN 8PLY 4X4IN 2PK LF STRL BX/50PK

## (undated) DEVICE — DRSNG WND BORDR/ADHS NONADHR/GZ LF 4X10IN STRL

## (undated) DEVICE — DRSNG SURESITE123 4X10IN

## (undated) DEVICE — GAUZE,SPONGE,FLUFF,6"X6.75",STRL,5/TRAY: Brand: MEDLINE

## (undated) DEVICE — 3.0MM PRECISION NEURO (MATCH HEAD)

## (undated) DEVICE — SOL IRR NACL 0.9PCT BT 1000ML

## (undated) DEVICE — CODMAN® SURGICAL PATTIES 1/2" X 1/2" (1.27CM X 1.27CM): Brand: CODMAN®

## (undated) DEVICE — SUT VIC 2 CP 27IN UD VCP195H

## (undated) DEVICE — GLV SURG TRIUMPH LT PF LTX 6.5 STRL

## (undated) DEVICE — GLV SURG SENSICARE GREEN W/ALOE PF LF 8 STRL

## (undated) DEVICE — 3M™ STERI-STRIP™ REINFORCED ADHESIVE SKIN CLOSURES, R1547, 1/2 IN X 4 IN (12 MM X 100 MM), 6 STRIPS/ENVELOPE: Brand: 3M™ STERI-STRIP™

## (undated) DEVICE — FLOSEAL MATRIX IS INDICATED IN SURGICAL PROCEDURES (OTHER THAN IN OPHTHALMIC) AS AN ADJUNCT TO HEMOSTASIS WHEN CONTROL OF BLEEDING BY LIGATURE OR CONVENTIONAL PROCEDURES IS INEFFECTIVE OR IMPRACTICAL.: Brand: FLOSEAL HEMOSTATIC MATRIX

## (undated) DEVICE — NDL SPINE 18G 31/2IN PNK